# Patient Record
Sex: MALE | Race: WHITE
[De-identification: names, ages, dates, MRNs, and addresses within clinical notes are randomized per-mention and may not be internally consistent; named-entity substitution may affect disease eponyms.]

---

## 2021-09-23 ENCOUNTER — HOSPITAL ENCOUNTER (EMERGENCY)
Age: 21
Discharge: HOME | End: 2021-09-23
Payer: COMMERCIAL

## 2021-09-23 VITALS
OXYGEN SATURATION: 97 % | RESPIRATION RATE: 14 BRPM | TEMPERATURE: 97.8 F | HEART RATE: 88 BPM | DIASTOLIC BLOOD PRESSURE: 76 MMHG | SYSTOLIC BLOOD PRESSURE: 139 MMHG

## 2021-09-23 VITALS — BODY MASS INDEX: 25.1 KG/M2

## 2021-09-23 VITALS
TEMPERATURE: 97.8 F | DIASTOLIC BLOOD PRESSURE: 76 MMHG | SYSTOLIC BLOOD PRESSURE: 139 MMHG | RESPIRATION RATE: 14 BRPM | HEART RATE: 88 BPM

## 2021-09-23 DIAGNOSIS — J02.0: Primary | ICD-10-CM

## 2021-09-23 PROCEDURE — C9803 HOPD COVID-19 SPEC COLLECT: HCPCS

## 2021-09-23 PROCEDURE — U0003 INFECTIOUS AGENT DETECTION BY NUCLEIC ACID (DNA OR RNA); SEVERE ACUTE RESPIRATORY SYNDROME CORONAVIRUS 2 (SARS-COV-2) (CORONAVIRUS DISEASE [COVID-19]), AMPLIFIED PROBE TECHNIQUE, MAKING USE OF HIGH THROUGHPUT TECHNOLOGIES AS DESCRIBED BY CMS-2020-01-R: HCPCS

## 2021-09-23 PROCEDURE — U0005 INFEC AGEN DETEC AMPLI PROBE: HCPCS

## 2021-09-23 PROCEDURE — G0463 HOSPITAL OUTPT CLINIC VISIT: HCPCS

## 2021-09-23 PROCEDURE — 99203 OFFICE O/P NEW LOW 30 MIN: CPT

## 2021-09-23 PROCEDURE — 87880 STREP A ASSAY W/OPTIC: CPT

## 2022-12-06 ENCOUNTER — LAB REQUISITION (OUTPATIENT)
Dept: LAB | Facility: HOSPITAL | Age: 22
End: 2022-12-06
Payer: COMMERCIAL

## 2022-12-06 DIAGNOSIS — J35.1 HYPERTROPHY OF TONSILS: ICD-10-CM

## 2022-12-06 PROCEDURE — 88304 TISSUE EXAM BY PATHOLOGIST: CPT

## 2022-12-08 LAB — REF LAB TEST METHOD: NORMAL

## 2024-04-27 ENCOUNTER — HOSPITAL ENCOUNTER (EMERGENCY)
Facility: HOSPITAL | Age: 24
Discharge: HOME | End: 2024-04-27
Attending: STUDENT IN AN ORGANIZED HEALTH CARE EDUCATION/TRAINING PROGRAM
Payer: COMMERCIAL

## 2024-04-27 ENCOUNTER — APPOINTMENT (OUTPATIENT)
Dept: GENERAL RADIOLOGY | Facility: HOSPITAL | Age: 24
End: 2024-04-27
Payer: COMMERCIAL

## 2024-04-27 VITALS
RESPIRATION RATE: 22 BRPM | DIASTOLIC BLOOD PRESSURE: 98 MMHG | OXYGEN SATURATION: 100 % | BODY MASS INDEX: 30.06 KG/M2 | TEMPERATURE: 97.8 F | HEART RATE: 75 BPM | HEIGHT: 70 IN | WEIGHT: 210 LBS | SYSTOLIC BLOOD PRESSURE: 153 MMHG

## 2024-04-27 DIAGNOSIS — R00.2 PALPITATIONS: Primary | ICD-10-CM

## 2024-04-27 LAB
ALBUMIN SERPL-MCNC: 4.9 G/DL (ref 3.5–5.2)
ALBUMIN/GLOB SERPL: 1.9 G/DL
ALP SERPL-CCNC: 82 U/L (ref 39–117)
ALT SERPL W P-5'-P-CCNC: 21 U/L (ref 1–41)
ANION GAP SERPL CALCULATED.3IONS-SCNC: 14.4 MMOL/L (ref 5–15)
AST SERPL-CCNC: 19 U/L (ref 1–40)
BASOPHILS # BLD AUTO: 0.03 10*3/MM3 (ref 0–0.2)
BASOPHILS NFR BLD AUTO: 0.5 % (ref 0–1.5)
BILIRUB SERPL-MCNC: 0.4 MG/DL (ref 0–1.2)
BUN SERPL-MCNC: 16 MG/DL (ref 6–20)
BUN/CREAT SERPL: 19 (ref 7–25)
CALCIUM SPEC-SCNC: 10.1 MG/DL (ref 8.6–10.5)
CHLORIDE SERPL-SCNC: 103 MMOL/L (ref 98–107)
CO2 SERPL-SCNC: 25.6 MMOL/L (ref 22–29)
CREAT SERPL-MCNC: 0.84 MG/DL (ref 0.76–1.27)
D DIMER PPP FEU-MCNC: <0.27 MCGFEU/ML (ref 0–0.5)
DEPRECATED RDW RBC AUTO: 39.9 FL (ref 37–54)
EGFRCR SERPLBLD CKD-EPI 2021: 126.4 ML/MIN/1.73
EOSINOPHIL # BLD AUTO: 0.11 10*3/MM3 (ref 0–0.4)
EOSINOPHIL NFR BLD AUTO: 1.8 % (ref 0.3–6.2)
ERYTHROCYTE [DISTWIDTH] IN BLOOD BY AUTOMATED COUNT: 12.2 % (ref 12.3–15.4)
FLUAV SUBTYP SPEC NAA+PROBE: NOT DETECTED
FLUBV RNA ISLT QL NAA+PROBE: NOT DETECTED
GLOBULIN UR ELPH-MCNC: 2.6 GM/DL
GLUCOSE BLDC GLUCOMTR-MCNC: 112 MG/DL (ref 70–130)
GLUCOSE SERPL-MCNC: 108 MG/DL (ref 65–99)
HCT VFR BLD AUTO: 42.4 % (ref 37.5–51)
HGB BLD-MCNC: 14.9 G/DL (ref 13–17.7)
IMM GRANULOCYTES # BLD AUTO: 0.01 10*3/MM3 (ref 0–0.05)
IMM GRANULOCYTES NFR BLD AUTO: 0.2 % (ref 0–0.5)
LYMPHOCYTES # BLD AUTO: 1.33 10*3/MM3 (ref 0.7–3.1)
LYMPHOCYTES NFR BLD AUTO: 21.3 % (ref 19.6–45.3)
MCH RBC QN AUTO: 31.4 PG (ref 26.6–33)
MCHC RBC AUTO-ENTMCNC: 35.1 G/DL (ref 31.5–35.7)
MCV RBC AUTO: 89.3 FL (ref 79–97)
MONOCYTES # BLD AUTO: 0.44 10*3/MM3 (ref 0.1–0.9)
MONOCYTES NFR BLD AUTO: 7 % (ref 5–12)
NEUTROPHILS NFR BLD AUTO: 4.33 10*3/MM3 (ref 1.7–7)
NEUTROPHILS NFR BLD AUTO: 69.2 % (ref 42.7–76)
NRBC BLD AUTO-RTO: 0 /100 WBC (ref 0–0.2)
NT-PROBNP SERPL-MCNC: <36 PG/ML (ref 0–450)
PLATELET # BLD AUTO: 268 10*3/MM3 (ref 140–450)
PMV BLD AUTO: 10.6 FL (ref 6–12)
POTASSIUM SERPL-SCNC: 4.4 MMOL/L (ref 3.5–5.2)
PROT SERPL-MCNC: 7.5 G/DL (ref 6–8.5)
RBC # BLD AUTO: 4.75 10*6/MM3 (ref 4.14–5.8)
SARS-COV-2 RNA RESP QL NAA+PROBE: NOT DETECTED
SODIUM SERPL-SCNC: 143 MMOL/L (ref 136–145)
TROPONIN T SERPL HS-MCNC: <6 NG/L
WBC NRBC COR # BLD AUTO: 6.25 10*3/MM3 (ref 3.4–10.8)

## 2024-04-27 PROCEDURE — 25810000003 SODIUM CHLORIDE 0.9 % SOLUTION: Performed by: STUDENT IN AN ORGANIZED HEALTH CARE EDUCATION/TRAINING PROGRAM

## 2024-04-27 PROCEDURE — 83880 ASSAY OF NATRIURETIC PEPTIDE: CPT | Performed by: STUDENT IN AN ORGANIZED HEALTH CARE EDUCATION/TRAINING PROGRAM

## 2024-04-27 PROCEDURE — 84484 ASSAY OF TROPONIN QUANT: CPT | Performed by: STUDENT IN AN ORGANIZED HEALTH CARE EDUCATION/TRAINING PROGRAM

## 2024-04-27 PROCEDURE — 85379 FIBRIN DEGRADATION QUANT: CPT | Performed by: STUDENT IN AN ORGANIZED HEALTH CARE EDUCATION/TRAINING PROGRAM

## 2024-04-27 PROCEDURE — 82948 REAGENT STRIP/BLOOD GLUCOSE: CPT

## 2024-04-27 PROCEDURE — 87636 SARSCOV2 & INF A&B AMP PRB: CPT | Performed by: STUDENT IN AN ORGANIZED HEALTH CARE EDUCATION/TRAINING PROGRAM

## 2024-04-27 PROCEDURE — 85025 COMPLETE CBC W/AUTO DIFF WBC: CPT | Performed by: STUDENT IN AN ORGANIZED HEALTH CARE EDUCATION/TRAINING PROGRAM

## 2024-04-27 PROCEDURE — 99284 EMERGENCY DEPT VISIT MOD MDM: CPT

## 2024-04-27 PROCEDURE — 93005 ELECTROCARDIOGRAM TRACING: CPT | Performed by: STUDENT IN AN ORGANIZED HEALTH CARE EDUCATION/TRAINING PROGRAM

## 2024-04-27 PROCEDURE — 80053 COMPREHEN METABOLIC PANEL: CPT | Performed by: STUDENT IN AN ORGANIZED HEALTH CARE EDUCATION/TRAINING PROGRAM

## 2024-04-27 PROCEDURE — 71045 X-RAY EXAM CHEST 1 VIEW: CPT

## 2024-04-27 RX ORDER — ONDANSETRON 4 MG/1
4 TABLET, ORALLY DISINTEGRATING ORAL EVERY 8 HOURS PRN
Qty: 20 TABLET | Refills: 0 | Status: SHIPPED | OUTPATIENT
Start: 2024-04-27

## 2024-04-27 RX ADMIN — SODIUM CHLORIDE 1000 ML: 9 INJECTION, SOLUTION INTRAVENOUS at 16:42

## 2024-04-27 NOTE — Clinical Note
[facility] EMERGENCY DEPARTMENT  [address] [facility] [address]  Phone: [phone]    Rory Pena was seen and treated in our emergency department on 4/27/2024.  He may return to work on 05/01/2024.         Thank you for choosing [facility].    Femi Jones MD

## 2024-04-27 NOTE — ED PROVIDER NOTES
Subjective:  History of Present Illness:    Patient is a 23-year-old male no significant medical history presents today with palpitations, dizziness, fast breathing.  Reports sudden onset of dizziness, fast breathing, chest pain.  Reports that he was attempting to get food this morning.  Says that he drank a significant quantity last night and believes this could be related.  Does report diarrhea prior to this incident.  No nausea vomiting.  Denies shortness of breath.  No preceding fevers.  Denies any unilateral leg swelling or leg pain.  No personal history of PE/DVT.      Nurses Notes reviewed and agree, including vitals, allergies, social history and prior medical history.     REVIEW OF SYSTEMS: All systems reviewed and not pertinent unless noted.  Review of Systems   Constitutional:  Positive for activity change. Negative for appetite change, chills, fatigue and fever.   HENT:  Negative for congestion, sinus pressure, sneezing and trouble swallowing.    Eyes:  Negative for discharge and itching.   Respiratory:  Negative for cough and shortness of breath.    Cardiovascular:  Positive for chest pain and palpitations.   Gastrointestinal:  Positive for diarrhea. Negative for abdominal distention, abdominal pain, nausea and vomiting.   Endocrine: Negative for cold intolerance and heat intolerance.   Genitourinary:  Negative for decreased urine volume, dysuria and urgency.   Musculoskeletal:  Negative for gait problem, neck pain and neck stiffness.   Skin:  Negative for color change and rash.   Allergic/Immunologic: Negative for immunocompromised state.   Neurological:  Positive for dizziness and light-headedness. Negative for facial asymmetry and headaches.   Hematological:  Negative for adenopathy.   Psychiatric/Behavioral:  Negative for self-injury and suicidal ideas.        History reviewed. No pertinent past medical history.    Allergies:    Patient has no known allergies.      History reviewed. No pertinent  "surgical history.      Social History     Socioeconomic History    Marital status: Single   Tobacco Use    Smoking status: Some Days     Current packs/day: 0.50     Types: Cigarettes    Smokeless tobacco: Never   Vaping Use    Vaping status: Every Day    Substances: Nicotine         History reviewed. No pertinent family history.    Objective  Physical Exam:  /98   Pulse 75   Temp 97.8 °F (36.6 °C) (Oral)   Resp 22   Ht 177.8 cm (70\")   Wt 95.3 kg (210 lb)   SpO2 100%   BMI 30.13 kg/m²      Physical Exam  Constitutional:       General: He is not in acute distress.     Appearance: Normal appearance. He is normal weight. He is not ill-appearing.   HENT:      Head: Normocephalic and atraumatic.      Nose: Nose normal. No congestion or rhinorrhea.      Mouth/Throat:      Mouth: Mucous membranes are moist.      Pharynx: Oropharynx is clear.   Eyes:      Extraocular Movements: Extraocular movements intact.      Conjunctiva/sclera: Conjunctivae normal.      Pupils: Pupils are equal, round, and reactive to light.   Cardiovascular:      Rate and Rhythm: Normal rate and regular rhythm.      Pulses: Normal pulses.   Pulmonary:      Effort: Pulmonary effort is normal. No respiratory distress.      Breath sounds: Normal breath sounds.   Abdominal:      General: Abdomen is flat. Bowel sounds are normal. There is no distension.      Palpations: Abdomen is soft.      Tenderness: There is no abdominal tenderness. There is no guarding or rebound.   Musculoskeletal:         General: No swelling or tenderness. Normal range of motion.      Cervical back: Normal range of motion and neck supple. No rigidity or tenderness.   Skin:     General: Skin is warm and dry.      Capillary Refill: Capillary refill takes less than 2 seconds.   Neurological:      General: No focal deficit present.      Mental Status: He is alert and oriented to person, place, and time. Mental status is at baseline.      Cranial Nerves: No cranial nerve " deficit.      Sensory: No sensory deficit.      Motor: No weakness.   Psychiatric:         Mood and Affect: Mood normal.         Behavior: Behavior normal.         Thought Content: Thought content normal.         Judgment: Judgment normal.         Procedures    ED Course:    ED Course as of 04/27/24 2330   Sat Apr 27, 2024   1612 EKG interpreted by me, normal sinus rhythm with no concerning ST changes noted, rate of 79 [JE]      ED Course User Index  [JE] Femi Jones MD       Lab Results (last 24 hours)       Procedure Component Value Units Date/Time    POC Glucose Once [[id number]]  (Normal) Collected: 04/27/24 1601    Specimen: Blood Updated: 04/27/24 1603     Glucose 112 mg/dL      Comment: Serial Number: [id number]:  [id number]       COVID-19 and FLU A/B PCR, 1 HR TAT - Swab, Nasopharynx [[id number]]  (Normal) Collected: 04/27/24 1634    Specimen: Swab from Nasopharynx Updated: 04/27/24 1656     COVID19 Not Detected     Influenza A PCR Not Detected     Influenza B PCR Not Detected    Narrative:      Fact sheet for providers: https://www.fda.gov/media/610140/download    Fact sheet for patients: https://www.fda.gov/media/460796/download    Test performed by PCR.    CBC & Differential [[id number]]  (Abnormal) Collected: 04/27/24 1642    Specimen: Blood Updated: 04/27/24 1649    Narrative:      The following orders were created for panel order CBC & Differential.  Procedure                               Abnormality         Status                     ---------                               -----------         ------                     CBC Auto Differential[[id number]]        Abnormal            Final result                 Please view results for these tests on the individual orders.    Comprehensive Metabolic Panel [[id number]]  (Abnormal) Collected: 04/27/24 1642    Specimen: Blood Updated: 04/27/24 1711     Glucose 108 mg/dL      BUN 16 mg/dL      Creatinine 0.84 mg/dL      Sodium 143 mmol/L       Potassium 4.4 mmol/L      Chloride 103 mmol/L      CO2 25.6 mmol/L      Calcium 10.1 mg/dL      Total Protein 7.5 g/dL      Albumin 4.9 g/dL      ALT (SGPT) 21 U/L      AST (SGOT) 19 U/L      Alkaline Phosphatase 82 U/L      Total Bilirubin 0.4 mg/dL      Globulin 2.6 gm/dL      A/G Ratio 1.9 g/dL      BUN/Creatinine Ratio 19.0     Anion Gap 14.4 mmol/L      eGFR 126.4 mL/min/1.73     Narrative:      GFR Normal >60  Chronic Kidney Disease <60  Kidney Failure <15      High Sensitivity Troponin T [[id number]]  (Normal) Collected: 04/27/24 1642    Specimen: Blood Updated: 04/27/24 1715     HS Troponin T <6 ng/L     Narrative:      High Sensitive Troponin T Reference Range:  <14.0 ng/L- Negative Female for AMI  <22.0 ng/L- Negative Male for AMI  >=14 - Abnormal Female indicating possible myocardial injury.  >=22 - Abnormal Male indicating possible myocardial injury.   Clinicians would have to utilize clinical acumen, EKG, Troponin, and serial changes to determine if it is an Acute Myocardial Infarction or myocardial injury due to an underlying chronic condition.         BNP [[id number]]  (Normal) Collected: 04/27/24 1642    Specimen: Blood Updated: 04/27/24 1716     proBNP <36.0 pg/mL     Narrative:      This assay is used as an aid in the diagnosis of individuals suspected of having heart failure. It can be used as an aid in the diagnosis of acute decompensated heart failure (ADHF) in patients presenting with signs and symptoms of ADHF to the emergency department (ED). In addition, NT-proBNP of <300 pg/mL indicates ADHF is not likely.    Age Range Result Interpretation  NT-proBNP Concentration (pg/mL:      <50             Positive            >450                   Gray                 300-450                    Negative             <300    50-75           Positive            >900                  Gray                300-900                  Negative            <300      >75             Positive            >1800            "       Beaulieu                300-1800                  Negative            <300    CBC Auto Differential [[id number]]  (Abnormal) Collected: 04/27/24 1642    Specimen: Blood Updated: 04/27/24 1649     WBC 6.25 10*3/mm3      RBC 4.75 10*6/mm3      Hemoglobin 14.9 g/dL      Hematocrit 42.4 %      MCV 89.3 fL      MCH 31.4 pg      MCHC 35.1 g/dL      RDW 12.2 %      RDW-SD 39.9 fl      MPV 10.6 fL      Platelets 268 10*3/mm3      Neutrophil % 69.2 %      Lymphocyte % 21.3 %      Monocyte % 7.0 %      Eosinophil % 1.8 %      Basophil % 0.5 %      Immature Grans % 0.2 %      Neutrophils, Absolute 4.33 10*3/mm3      Lymphocytes, Absolute 1.33 10*3/mm3      Monocytes, Absolute 0.44 10*3/mm3      Eosinophils, Absolute 0.11 10*3/mm3      Basophils, Absolute 0.03 10*3/mm3      Immature Grans, Absolute 0.01 10*3/mm3      nRBC 0.0 /100 WBC     D-dimer, Quantitative [[id number]]  (Normal) Collected: 04/27/24 1642    Specimen: Blood Updated: 04/27/24 1716     D-Dimer, Quantitative <0.27 MCGFEU/mL     Narrative:      According to the assay 's published package insert, a normal (<0.50 MCGFEU/mL) D-dimer result in conjunction with a non-high clinical probability assessment, excludes deep vein thrombosis (DVT) and pulmonary embolism (PE) with high sensitivity.    D-dimer values increase with age and this can make VTE exclusion of an older population difficult. To address this, the [employer], based on best available evidence and recent guidelines, recommends that clinicians use age-adjusted D-dimer thresholds in patients greater than 50 years of age with: a) a low probability of PE who do not meet all Pulmonary Embolism Rule Out Criteria, or b) in those with intermediate probability of PE.   The formula for an age-adjusted D-dimer cut-off is \"age/100\".  For example, a 60 year old patient would have an age-adjusted cut-off of 0.60 MCGFEU/mL and an 80 year old 0.80 MCGFEU/mL.             No radiology results " from the last 24 hrs       MDM     Amount and/or Complexity of Data Reviewed  Independent visualization of images, tracings, or specimens: yes        Initial impression of presenting illness: Chest pain, dizziness, lightheadedness    DDX: includes but is not limited to: Alcohol intoxication, dehydration, viral URI, SVT, ACS, bacterial pneumonia PE    Patient arrives stable with vitals interpreted by myself.     Pertinent features from physical exam: Oscitation, regarding rhythm, no murmur, nontender to Dalm palpation, not tachycardic, not tachypneic, not hypoxic at the time my exam.    Initial diagnostic plan: CBC, CMP, troponin, BNP, D-dimer, EKG, chest x-ray    Results from initial plan were reviewed and interpreted by me revealing no concern for acute cardiac process, no significant electrolyte derangement, no concern for PE, no concern for bacterial pneumonia    Diagnostic information from other sources: Discussed with friend and mother and father at bedside and reviewed past medical records    Interventions / Re-evaluation: Given IV fluids and Zofran in the emergency department    Results/clinical rationale were discussed with patient at bedside    Consultations/Discussion of results with other physicians: Negative workup in emergency department, no concern for cardiac process.  Suspect patient's symptoms likely related to alcohol intoxication.  Encourage oral hydration and follow-up with patient's primary care doctor to ensure resolution of symptoms.    Disposition plan: Discharge  -----        Final diagnoses:   Palpitations          Femi Jones MD  04/27/24 6542

## 2024-04-27 NOTE — DISCHARGE INSTRUCTIONS
Evaluated for palpitations.  We got labs and chest x-ray that showed no concern for problems with your heart, blood clot, or any problems with your electrolytes.  We gave you IV fluids in the emergency department you are now stable for discharge.  As we discussed, we believe your symptoms may be related to dehydration.  We have given you a prescription for Zofran to help with nausea and vomiting at home and recommend drinking plenty of fluids to prevent dehydration.  Would follow with primary care doctor to ensure that you improve appropriately.  If you begin to have chest pain or shortness of breath please contact emergency department for further evaluation.  You are now stable for discharge.

## 2024-07-11 ENCOUNTER — OFFICE VISIT (OUTPATIENT)
Dept: CARDIOLOGY | Facility: CLINIC | Age: 24
End: 2024-07-11
Payer: COMMERCIAL

## 2024-07-11 VITALS
HEART RATE: 97 BPM | SYSTOLIC BLOOD PRESSURE: 118 MMHG | HEIGHT: 70 IN | WEIGHT: 216 LBS | BODY MASS INDEX: 30.92 KG/M2 | OXYGEN SATURATION: 98 % | DIASTOLIC BLOOD PRESSURE: 64 MMHG

## 2024-07-11 DIAGNOSIS — R00.2 PALPITATIONS: Primary | ICD-10-CM

## 2024-07-11 RX ORDER — ESCITALOPRAM OXALATE 10 MG/1
1 TABLET ORAL DAILY
COMMUNITY
Start: 2024-06-19

## 2024-07-11 NOTE — ASSESSMENT & PLAN NOTE
Reports sudden onset of dizziness, fast breathing, and chest pain. Associated with elevated blood pressure.  He states that his blood pressure was 175/90.  He state he thinks these episodes are related to his anxiety.  He says he mostly has the episodes when he is just sitting doing nothing and at bedtime.     Plan echo for evaluation of heart function or any valvular abnormalities.

## 2024-08-08 ENCOUNTER — OFFICE VISIT (OUTPATIENT)
Dept: CARDIOLOGY | Facility: CLINIC | Age: 24
End: 2024-08-08
Payer: COMMERCIAL

## 2024-08-08 VITALS
HEIGHT: 70 IN | SYSTOLIC BLOOD PRESSURE: 134 MMHG | DIASTOLIC BLOOD PRESSURE: 86 MMHG | BODY MASS INDEX: 31.35 KG/M2 | HEART RATE: 77 BPM | WEIGHT: 219 LBS | OXYGEN SATURATION: 96 %

## 2024-08-08 DIAGNOSIS — R00.2 PALPITATIONS: ICD-10-CM

## 2024-08-08 DIAGNOSIS — I34.0 NONRHEUMATIC MITRAL VALVE REGURGITATION: Primary | ICD-10-CM

## 2024-08-08 NOTE — PROGRESS NOTES
[facility] Provider Note     Date:   2024  Name: Rory Pena  :   2000  PCP: Claudia Shah DO    Chief Complaint   Patient presents with   • Palpitations       Subjective     History of Present Illness  Rory Pena is a 24 y.o. male who presents today for ECHO results.    Patient states that he has been doing very well.  He states that his palpitations are improved since starting the Lexapro.  I have reviewed and discussed the preliminary echo results with patient.  It was noted that he had mild to moderate mitral valve regurgitation.  He states that he does not have a family history nor was he ever told as a child that he had a murmur.  Patient denies any illicit drug use.  He states that he does drink alcohol heavily.  He reports daily use.  I have discussed being checked for sleep apnea.  Patient states he does not feel that he has sleep apnea he is not sleepy during the day.  He states that he does snore some.  Patient states that he stays physically active with his job as a .  He denies any exertional chest pain or shortness of breath.  He states that he does play basketball with the [employer] and never has an issue.  Will plan a cardiac MRI to further evaluate valvular or structural abnormalities.      Cardiac History:    24 ECHO Left ventricular systolic function is normal. Calculated left ventricular EF = 62.7% Left ventricular ejection fraction appears to be 61 - 65%.  Left ventricular diastolic function was normal.  Mild to moderate mitral valve regurgitation is present.    Holter 5/3/24 Patient had an average minimum heart rate of 54 bpm, average mean of 76 bpm and average maximum heart rate of 116 bpm.  Tachycardia was present with a 0.3% burden and a high heart rate of 129 bpm.  Ventricular ectopic beats were 0% and supraventricular ectopic beats were 0%.     Reports Denies   Chest Pain [] [x]   Shortness of Air [] [x]   Palpitations  "[x]Improved with anxiety medications []   Edema [] [x]   Dizziness [] [x]   Syncope [] [x]       No Known Allergies    Current Outpatient Medications:   •  escitalopram (LEXAPRO) 10 MG tablet, Take 1 tablet by mouth Daily., Disp: , Rfl:     Past Medical History:   Diagnosis Date   • Anxiety       History reviewed. No pertinent surgical history.  Family History   Family history unknown: Yes     Social History     Socioeconomic History   • Marital status: Single   Tobacco Use   • Smoking status: Some Days     Types: Cigarettes     Passive exposure: Current   • Smokeless tobacco: Never   Vaping Use   • Vaping status: Every Day   • Substances: Nicotine   • Passive vaping exposure: Yes   Substance and Sexual Activity   • Alcohol use: Yes     Alcohol/week: 20.0 standard drinks of alcohol     Types: 20 Cans of beer per week     Comment: weekly   • Drug use: Not Currently   • Sexual activity: Defer       Objective     Vital Signs:  /86   Pulse 77   Ht 177.8 cm (70\")   Wt 99.3 kg (219 lb)   SpO2 96%   BMI 31.42 kg/m²   Estimated body mass index is 31.42 kg/m² as calculated from the following:    Height as of this encounter: 177.8 cm (70\").    Weight as of this encounter: 99.3 kg (219 lb).             Physical Exam  Constitutional:       Appearance: Normal appearance.   Cardiovascular:      Rate and Rhythm: Normal rate and regular rhythm.      Pulses: Normal pulses.      Heart sounds: Normal heart sounds.   Pulmonary:      Effort: Pulmonary effort is normal.      Breath sounds: Normal breath sounds.   Musculoskeletal:      Right lower leg: No edema.      Left lower leg: No edema.   Skin:     General: Skin is warm and dry.      Capillary Refill: Capillary refill takes less than 2 seconds.   Neurological:      General: No focal deficit present.      Mental Status: He is alert and oriented to person, place, and time.   Psychiatric:         Mood and Affect: Mood normal.         Behavior: Behavior normal.         " Thought Content: Thought content normal.         Judgment: Judgment normal.         The following data was reviewed by: MASSIEL Kay on 08/08/2024:    Preliminary echo results have been discussed and reviewed with patient.          Assessment and Plan     Diagnoses and all orders for this visit:    1. Nonrheumatic mitral valve regurgitation (Primary)  Assessment & Plan:  Preliminary echo results show mild to moderate mitral valve regurgitation.    Plan Cardiac MRI for further evaluation.      2. Palpitations  Assessment & Plan:  Patient reports improved palpitations since starting his anxiety medications.    Orders:  -     MRI Cardiac Function Complete With & Without Morphology; Future        Recommendations: ER if symptoms increase and Report if any new/changing symptoms immediately          Follow Up  Return in about 8 weeks (around 10/3/2024) for Cardiac MRI results.  Patient was given instructions and counseling regarding his condition or for health maintenance advice. Please see specific information pulled into the AVS if appropriate.

## 2024-08-08 NOTE — ASSESSMENT & PLAN NOTE
Preliminary echo results show mild to moderate mitral valve regurgitation.    Plan Cardiac MRI for further evaluation.

## 2024-11-14 ENCOUNTER — TELEPHONE (OUTPATIENT)
Dept: CARDIOLOGY | Facility: CLINIC | Age: 24
End: 2024-11-14
Payer: COMMERCIAL

## 2024-11-14 NOTE — TELEPHONE ENCOUNTER
----- Message from Gin Beasley sent at 11/14/2024 10:19 AM EST -----  Please eloise the patient and let him know his cardiac MRI came back and he has normal heart function and shows no significant valvular abnormalities or structural abnormalities.  Normal study.  If he would like to discuss further please schedule the patient a follow up appointment.  ----- Message -----  From: Interface, Scans Incoming  Sent: 11/14/2024   8:30 AM EST  To: Gin Beasley, APRN

## 2025-06-27 NOTE — PROGRESS NOTES
[facility] Provider Note     Date:   2024  Name: Rory Pena  :   2000  PCP: Claudia Shah DO    Chief Complaint   Patient presents with   • Palpitations   • Cardiac Consult       Subjective     History of Present Illness  Rory Pena is a 23 y.o. male who presents today for cardiac evaluation for palpitations.    Patient states he was out working  and started feeling like his heart was racing.  Reports sudden onset of dizziness, fast breathing, and chest pain. Associated with elevated blood pressure.  He states that his blood pressure was 175/90.  He state he thinks these episodes are related to his anxiety.  He says he mostly has the episodes when he is just sitting doing nothing and at bedtime.  His PCP started him on anxiety medication and he feels like it has helped.  He states for almost a month he has not had an episode.  His PCP also did a 24 hour heart monitor.  These results have been reviewed.      Patient denies any exertional CP or SOA.      No specialty comments available.     Reports Denies   Chest Pain [x]With episodes of palpitations []   Shortness of Air [] [x]   Palpitations [x]1 x week []   Edema [] [x]   Dizziness [x] []   Syncope [] [x]       No Known Allergies    Current Outpatient Medications:   •  escitalopram (LEXAPRO) 10 MG tablet, Take 1 tablet by mouth Daily., Disp: , Rfl:     Past Medical History:   Diagnosis Date   • Anxiety       History reviewed. No pertinent surgical history.  Family History   Family history unknown: Yes     Social History     Socioeconomic History   • Marital status: Single   Tobacco Use   • Smoking status: Some Days     Types: Cigarettes     Passive exposure: Current   • Smokeless tobacco: Never   Vaping Use   • Vaping status: Every Day   • Substances: Nicotine   • Passive vaping exposure: Yes   Substance and Sexual Activity   • Alcohol use: Yes     Alcohol/week: 20.0 standard drinks of alcohol     Types:  "20 Cans of beer per week     Comment: weekly   • Drug use: Not Currently   • Sexual activity: Defer       Objective     Vital Signs:  /64   Pulse 97   Ht 177.8 cm (70\")   Wt 98 kg (216 lb)   SpO2 98%   BMI 30.99 kg/m²   Estimated body mass index is 30.99 kg/m² as calculated from the following:    Height as of this encounter: 177.8 cm (70\").    Weight as of this encounter: 98 kg (216 lb).             Physical Exam  Constitutional:       Appearance: Normal appearance.   Cardiovascular:      Rate and Rhythm: Normal rate and regular rhythm.      Pulses: Normal pulses.      Heart sounds: Normal heart sounds.   Pulmonary:      Effort: Pulmonary effort is normal.      Breath sounds: Normal breath sounds.   Musculoskeletal:      Right lower leg: No edema.      Left lower leg: No edema.   Skin:     General: Skin is warm and dry.      Capillary Refill: Capillary refill takes less than 2 seconds.   Neurological:      General: No focal deficit present.      Mental Status: He is alert and oriented to person, place, and time.   Psychiatric:         Mood and Affect: Mood normal.         Behavior: Behavior normal.       The following data was reviewed by: MASSIEL Kay on 07/11/2024:  CMP          4/27/2024    16:42   CMP   Glucose 108    BUN 16    Creatinine 0.84    EGFR 126.4    Sodium 143    Potassium 4.4    Chloride 103    Calcium 10.1    Total Protein 7.5    Albumin 4.9    Globulin 2.6    Total Bilirubin 0.4    Alkaline Phosphatase 82    AST (SGOT) 19    ALT (SGPT) 21    Albumin/Globulin Ratio 1.9    BUN/Creatinine Ratio 19.0    Anion Gap 14.4      CBC          4/27/2024    16:42   CBC   WBC 6.25    RBC 4.75    Hemoglobin 14.9    Hematocrit 42.4    MCV 89.3    MCH 31.4    MCHC 35.1    RDW 12.2    Platelets 268      Holter monitor has been reviewed.    Last OV note from Dr. Saleh has been reviewed.          ECG 12 Lead    Date/Time: 7/11/2024 2:25 PM  Performed by: Gin Beasley APRN    Authorized by: " Gin Beasley APRN  Comparison: compared with previous ECG from 4/27/2024  Comparison to previous ECG: Sinus rhythm with sinus arrhythmia cannot rule out anterior infarct.  Rhythm: sinus rhythm  Rate: normal  BPM: 65  Conduction: conduction normal  ST Segments: ST segments normal  T Waves: T waves normal  QRS axis: normal  Other: no other findings    Clinical impression: normal ECG         Assessment and Plan     Diagnoses and all orders for this visit:    1. Palpitations (Primary)  Assessment & Plan:  Reports sudden onset of dizziness, fast breathing, and chest pain. Associated with elevated blood pressure.  He states that his blood pressure was 175/90.  He state he thinks these episodes are related to his anxiety.  He says he mostly has the episodes when he is just sitting doing nothing and at bedtime.     Plan echo for evaluation of heart function or any valvular abnormalities.      Orders:  -     Adult Transthoracic Echo Complete W/ Cont if Necessary Per Protocol; Future  -     ECG 12 Lead        Recommendations: ER if symptoms increase, Report if any new/changing symptoms immediately, Limit salt, and Stop cigarettes          Follow Up  Return in about 4 weeks (around 8/8/2024) for Follow up test results.  Patient was given instructions and counseling regarding his condition or for health maintenance advice. Please see specific information pulled into the AVS if appropriate.   Pitocin